# Patient Record
Sex: MALE | Race: WHITE
[De-identification: names, ages, dates, MRNs, and addresses within clinical notes are randomized per-mention and may not be internally consistent; named-entity substitution may affect disease eponyms.]

---

## 2018-10-05 PROBLEM — Z00.00 ENCOUNTER FOR PREVENTIVE HEALTH EXAMINATION: Status: ACTIVE | Noted: 2018-10-05

## 2019-02-27 ENCOUNTER — APPOINTMENT (OUTPATIENT)
Dept: ORTHOPEDIC SURGERY | Facility: CLINIC | Age: 29
End: 2019-02-27
Payer: COMMERCIAL

## 2019-02-27 VITALS
BODY MASS INDEX: 33.37 KG/M2 | WEIGHT: 260 LBS | SYSTOLIC BLOOD PRESSURE: 125 MMHG | HEIGHT: 74 IN | DIASTOLIC BLOOD PRESSURE: 78 MMHG | HEART RATE: 75 BPM

## 2019-02-27 DIAGNOSIS — Z82.62 FAMILY HISTORY OF OSTEOPOROSIS: ICD-10-CM

## 2019-02-27 DIAGNOSIS — Z78.9 OTHER SPECIFIED HEALTH STATUS: ICD-10-CM

## 2019-02-27 DIAGNOSIS — M76.822 POSTERIOR TIBIAL TENDINITIS, LEFT LEG: ICD-10-CM

## 2019-02-27 DIAGNOSIS — M76.62 ACHILLES TENDINITIS, LEFT LEG: ICD-10-CM

## 2019-02-27 DIAGNOSIS — M79.672 PAIN IN LEFT FOOT: ICD-10-CM

## 2019-02-27 DIAGNOSIS — Z82.61 FAMILY HISTORY OF ARTHRITIS: ICD-10-CM

## 2019-02-27 PROCEDURE — 73630 X-RAY EXAM OF FOOT: CPT | Mod: LT

## 2019-02-27 PROCEDURE — 99204 OFFICE O/P NEW MOD 45 MIN: CPT

## 2019-02-27 RX ORDER — MELOXICAM 15 MG/1
TABLET ORAL
Refills: 0 | Status: ACTIVE | COMMUNITY

## 2019-02-27 RX ORDER — MELOXICAM 7.5 MG/1
7.5 TABLET ORAL DAILY
Qty: 30 | Refills: 2 | Status: ACTIVE | COMMUNITY
Start: 2019-02-27 | End: 1900-01-01

## 2019-02-27 RX ORDER — DICLOFENAC SODIUM 10 MG/G
1 GEL TOPICAL DAILY
Qty: 1 | Refills: 0 | Status: ACTIVE | COMMUNITY
Start: 2019-02-27 | End: 1900-01-01

## 2019-02-27 NOTE — DISCUSSION/SUMMARY
[de-identified] : Today in the office I had a lengthy discussion with the patient regarding his L foot pain. I have addressed all of the patient's concern surrounding the pathology of their conditions.  I provided them with the education on the anatomy and pathology as well as the overall lifespan of this issue using a foot model. I have suggested he may wish to continue with his sx with Dr Igor Nunn as he wishes in regards to lengthening vs full reconstruction. For the interm I have given him a PTTD vs ASO to use. I will f/u with him prn. All questions were answered and the patient verbalized understanding.  The patient is in agreement with this treatment plan.\par

## 2019-02-27 NOTE — HISTORY OF PRESENT ILLNESS
[FreeTextEntry1] : Pt is a 28 year old  M present in the office today in regards to his L foot pain. Pt notes he fractured his L foot playing football x 3 years ago. He notes it may have not healed right and he is currently still having pain to this day. His current pain level is a 8/10. He reports his  pain is exacerbated with walking and weight bearing. He is not currently taking any pain medications at this moment. Pt notes he has a lengthening procedure with Dr Igor Nunn. Pt is an . No other complaints at this time.\par Second opinion from Dr. Avitia.

## 2019-02-27 NOTE — ADDENDUM
[FreeTextEntry1] : Documented by Cristal Nowak acting as a scribe for Dr. Mckay on 02/27/2019 \par \par All medical record entries made by the Scribe were at my, Dr. Nieves, direction and\par personally dictated by me on 02/27/2019 . I have reviewed the chart and agree that the record\par accurately reflects my personal performance of the history, physical exam, procedure and imaging.\par

## 2019-02-27 NOTE — CONSULT LETTER
[Consult Letter:] : I had the pleasure of evaluating your patient, [unfilled]. [Consult Closing:] : Thank you very much for allowing me to participate in the care of this patient.  If you have any questions, please do not hesitate to contact me. [Sincerely,] : Sincerely, [FreeTextEntry2] :  Igor Nunn [FreeTextEntry3] : Tiburcio Mckay\par

## 2019-02-27 NOTE — PHYSICAL EXAM
[de-identified] : General: Alert and oriented x3. In no acute distress. Pleasant in nature with a normal affect. No apparent respiratory distress.\par \par L Foot Exam\par Skin: Clean, dry and intact.\par Inspection: No obvious malalignment, no swelling, no effusion. No lymphadenopathy. \par Pulses: 2+ DP/PT pulses\par ROM: Foot:   Full ROM of digits. Ankle: 10 degrees dorsiflexion,  10 degrees with knee flexion, 10   degrees of subtalar motion.\par Tenderness: + pain to posterior Achilles at the insertion sight, thickening noted.  No tenderness over the lateral malleolus, no CFL/ATFL/PTFL pain. No medial malleolus pain, no deltoid ligament pain.  No heel pain. No Achilles tenderness. No 5th metatarsal pain. No pain to the LisFranc joint. No ttp over the posterior tibial tendon. \par Painful ROM: None\par Stability: Negative anterior/posterior drawer. \par Strength: 5/5 ADD/ABD/TA/GS/EHL/FHL/EDL\par Neuro: Intact to light touch throughout\par Additional tests: Negative Rayo's test, negative tarsal tunnel tinel's, negative single heel rise.\par  [de-identified] : 3V of L foot were ordered obtained and reviewed by me today revealed significant loss of calcaneal pitch, Mango deformity, and equinus contracture. \par

## 2020-11-22 ENCOUNTER — TRANSCRIPTION ENCOUNTER (OUTPATIENT)
Age: 30
End: 2020-11-22

## 2021-01-02 ENCOUNTER — TRANSCRIPTION ENCOUNTER (OUTPATIENT)
Age: 31
End: 2021-01-02

## 2022-08-27 ENCOUNTER — RESULT REVIEW (OUTPATIENT)
Age: 32
End: 2022-08-27

## 2024-10-10 ENCOUNTER — NON-APPOINTMENT (OUTPATIENT)
Age: 34
End: 2024-10-10